# Patient Record
Sex: FEMALE
[De-identification: names, ages, dates, MRNs, and addresses within clinical notes are randomized per-mention and may not be internally consistent; named-entity substitution may affect disease eponyms.]

---

## 2019-09-17 ENCOUNTER — APPOINTMENT (OUTPATIENT)
Dept: CARDIOLOGY | Facility: CLINIC | Age: 59
End: 2019-09-17
Payer: MEDICAID

## 2019-09-17 ENCOUNTER — NON-APPOINTMENT (OUTPATIENT)
Age: 59
End: 2019-09-17

## 2019-09-17 VITALS
HEIGHT: 63 IN | SYSTOLIC BLOOD PRESSURE: 146 MMHG | OXYGEN SATURATION: 100 % | DIASTOLIC BLOOD PRESSURE: 80 MMHG | BODY MASS INDEX: 23.57 KG/M2 | HEART RATE: 54 BPM | WEIGHT: 133 LBS

## 2019-09-17 PROCEDURE — 99204 OFFICE O/P NEW MOD 45 MIN: CPT

## 2019-09-17 PROCEDURE — 93000 ELECTROCARDIOGRAM COMPLETE: CPT

## 2019-09-17 NOTE — HISTORY OF PRESENT ILLNESS
[FreeTextEntry1] : 59 year old woman with history of HTN and diet controlled HLD comes in after 3 years with abnormal stress echo. She was referred for stress echo after vague anterior chest pain that she related to GERD. She walked for 9 minutes and 30 seconds on the Justin protocol (results scanned in ) and she developed possible anterior WMA on stress imaging. She was told to get CTA and sought my opinion. EKG reviewed and no ischemic changes. Remains on Atenolol\par She is very active without pain.

## 2019-09-17 NOTE — PHYSICAL EXAM
[General Appearance - Well Developed] : well developed [Normal Appearance] : normal appearance [Well Groomed] : well groomed [General Appearance - Well Nourished] : well nourished [No Deformities] : no deformities [General Appearance - In No Acute Distress] : no acute distress [Normal Oral Mucosa] : normal oral mucosa [No Oral Pallor] : no oral pallor [No Oral Cyanosis] : no oral cyanosis [Normal Jugular Venous A Waves Present] : normal jugular venous A waves present [Normal Jugular Venous V Waves Present] : normal jugular venous V waves present [No Jugular Venous Ceron A Waves] : no jugular venous ceron A waves [Heart Rate And Rhythm] : heart rate and rhythm were normal [Murmurs] : no murmurs present [Heart Sounds] : normal S1 and S2 [Respiration, Rhythm And Depth] : normal respiratory rhythm and effort [Exaggerated Use Of Accessory Muscles For Inspiration] : no accessory muscle use [Auscultation Breath Sounds / Voice Sounds] : lungs were clear to auscultation bilaterally [Abdomen Soft] : soft [Abdomen Tenderness] : non-tender [Abdomen Mass (___ Cm)] : no abdominal mass palpated [Gait - Sufficient For Exercise Testing] : the gait was sufficient for exercise testing [Abnormal Walk] : normal gait [Nail Clubbing] : no clubbing of the fingernails [Cyanosis, Localized] : no localized cyanosis [Petechial Hemorrhages (___cm)] : no petechial hemorrhages [Skin Color & Pigmentation] : normal skin color and pigmentation [] : no rash [No Venous Stasis] : no venous stasis [Skin Lesions] : no skin lesions [No Skin Ulcers] : no skin ulcer [No Xanthoma] : no  xanthoma was observed [Oriented To Time, Place, And Person] : oriented to person, place, and time [Affect] : the affect was normal [Mood] : the mood was normal [No Anxiety] : not feeling anxious

## 2019-09-17 NOTE — DISCUSSION/SUMMARY
[FreeTextEntry1] : 59 year old woman with HTN HLD and abnormal stress test here for followup\par -check CTA Cors to assess for significant CAD\par -Reinforced diet control of lipids\par (labs reviewed and relatively unchanged from 2016 however will readdress after CT)\par -continue atenolol

## 2019-10-11 ENCOUNTER — OUTPATIENT (OUTPATIENT)
Dept: OUTPATIENT SERVICES | Facility: HOSPITAL | Age: 59
LOS: 1 days | End: 2019-10-11
Payer: MEDICAID

## 2019-10-11 ENCOUNTER — APPOINTMENT (OUTPATIENT)
Dept: CARDIOLOGY | Facility: CLINIC | Age: 59
End: 2019-10-11

## 2019-10-11 DIAGNOSIS — R07.9 CHEST PAIN, UNSPECIFIED: ICD-10-CM

## 2019-10-11 PROCEDURE — 75574 CT ANGIO HRT W/3D IMAGE: CPT | Mod: 26

## 2019-10-11 PROCEDURE — 75574 CT ANGIO HRT W/3D IMAGE: CPT

## 2019-10-15 ENCOUNTER — MESSAGE (OUTPATIENT)
Age: 59
End: 2019-10-15

## 2019-10-29 ENCOUNTER — NON-APPOINTMENT (OUTPATIENT)
Age: 59
End: 2019-10-29

## 2019-10-29 ENCOUNTER — APPOINTMENT (OUTPATIENT)
Dept: CARDIOLOGY | Facility: CLINIC | Age: 59
End: 2019-10-29
Payer: MEDICAID

## 2019-10-29 VITALS
OXYGEN SATURATION: 100 % | WEIGHT: 130 LBS | HEART RATE: 58 BPM | DIASTOLIC BLOOD PRESSURE: 81 MMHG | SYSTOLIC BLOOD PRESSURE: 155 MMHG | HEIGHT: 63 IN | BODY MASS INDEX: 23.04 KG/M2

## 2019-10-29 PROCEDURE — 99214 OFFICE O/P EST MOD 30 MIN: CPT

## 2019-10-29 PROCEDURE — 93000 ELECTROCARDIOGRAM COMPLETE: CPT

## 2019-10-29 NOTE — HISTORY OF PRESENT ILLNESS
[FreeTextEntry1] : Here for followup. Had CT cor on 10/11/19 and results below. No complaints. On ASA therapy and Atenolol daily. EKG reviewed and normal.\par \par Lipids checked 9/11/19 at outside offcie showed //HDL 49/ \par \par Cardiac CT:\par IMPRESSION:  \par 1. Coronary artery calcium score =  1 Agatston Units (between the 50th \par and 75th percentile for subjects of the same age and gender).\par 2. Coronaries: \par --LM: contains luminal irregularities but is patent. \par --LAD: contains mixed plaque in the proximal segment with mild \par (approximately 30%) luminal stenosis.  There is non-calcified plaque in \par the mid LAD with likely moderate (approximately 60-69%) luminal stenosis. \par --LCX: contains luminal irregularities but appears patent.  \par --RCA: contains non-calcified plaque in the proximal segment with mild \par (approximately 30%) luminal stenosis. \par \par \par

## 2019-10-29 NOTE — DISCUSSION/SUMMARY
[FreeTextEntry1] : 59 year old woman with history of HTN and HLD now with mild to moderate plaque on CTA Cor here for followup. Asymptomatic\par EKG unchanged\par Continue ASA\par Start Lipitor 10 mg daily\par Cont Atenolol\par FU in 3 months

## 2019-10-29 NOTE — PHYSICAL EXAM
[General Appearance - Well Developed] : well developed [Normal Appearance] : normal appearance [Well Groomed] : well groomed [General Appearance - Well Nourished] : well nourished [No Deformities] : no deformities [General Appearance - In No Acute Distress] : no acute distress [Normal Oral Mucosa] : normal oral mucosa [No Oral Pallor] : no oral pallor [No Oral Cyanosis] : no oral cyanosis [Normal Jugular Venous A Waves Present] : normal jugular venous A waves present [Normal Jugular Venous V Waves Present] : normal jugular venous V waves present [No Jugular Venous Ceron A Waves] : no jugular venous ceron A waves [Respiration, Rhythm And Depth] : normal respiratory rhythm and effort [Exaggerated Use Of Accessory Muscles For Inspiration] : no accessory muscle use [Auscultation Breath Sounds / Voice Sounds] : lungs were clear to auscultation bilaterally [Heart Rate And Rhythm] : heart rate and rhythm were normal [Heart Sounds] : normal S1 and S2 [Murmurs] : no murmurs present [Abdomen Soft] : soft [Abdomen Tenderness] : non-tender [Abdomen Mass (___ Cm)] : no abdominal mass palpated [Abnormal Walk] : normal gait [Gait - Sufficient For Exercise Testing] : the gait was sufficient for exercise testing [Nail Clubbing] : no clubbing of the fingernails [Cyanosis, Localized] : no localized cyanosis [Petechial Hemorrhages (___cm)] : no petechial hemorrhages [Skin Color & Pigmentation] : normal skin color and pigmentation [] : no rash [No Venous Stasis] : no venous stasis [Skin Lesions] : no skin lesions [No Skin Ulcers] : no skin ulcer [No Xanthoma] : no  xanthoma was observed [Oriented To Time, Place, And Person] : oriented to person, place, and time [Affect] : the affect was normal [Mood] : the mood was normal [No Anxiety] : not feeling anxious

## 2019-10-30 ENCOUNTER — APPOINTMENT (OUTPATIENT)
Dept: OTOLARYNGOLOGY | Facility: CLINIC | Age: 59
End: 2019-10-30

## 2019-12-19 ENCOUNTER — APPOINTMENT (OUTPATIENT)
Dept: OTOLARYNGOLOGY | Facility: CLINIC | Age: 59
End: 2019-12-19
Payer: COMMERCIAL

## 2019-12-19 VITALS
WEIGHT: 130 LBS | DIASTOLIC BLOOD PRESSURE: 98 MMHG | SYSTOLIC BLOOD PRESSURE: 160 MMHG | HEART RATE: 99 BPM | BODY MASS INDEX: 23.04 KG/M2 | HEIGHT: 63 IN

## 2019-12-19 DIAGNOSIS — H93.11 TINNITUS, RIGHT EAR: ICD-10-CM

## 2019-12-19 DIAGNOSIS — R42 DIZZINESS AND GIDDINESS: ICD-10-CM

## 2019-12-19 PROCEDURE — 99204 OFFICE O/P NEW MOD 45 MIN: CPT
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
## 2019-12-19 NOTE — PHYSICAL EXAM
[Nystagmus] : ~T no ~M nystagmus was seen [Fukuda Step Test] : Fukuda Step Test was Negative [Fistula Sign] : Fistula Sign: Negative [Lucia-Hallslimke] : Salmon-Hallpike: Negative [de-identified] : wnl [Midline] : trachea located in midline position [Normal] : no rashes

## 2019-12-19 NOTE — ASSESSMENT
[FreeTextEntry1] : r/o Right delayed endolymphatic Hydrops w/ assoc tinnitus\par  2/2 trauma\par No TMJ noted\par Rec acupuncture and Arches Vitamin tx\par void loud noises\par will obtain audio for my review\par f/u 2-3 months

## 2019-12-19 NOTE — REASON FOR VISIT
[Initial Evaluation] : an initial evaluation for [Vertigo] : vertigo [Tinnitus] : tinnitus [Spouse] : spouse

## 2020-01-31 ENCOUNTER — APPOINTMENT (OUTPATIENT)
Dept: CARDIOLOGY | Facility: CLINIC | Age: 60
End: 2020-01-31

## 2020-03-05 ENCOUNTER — APPOINTMENT (OUTPATIENT)
Dept: OTOLARYNGOLOGY | Facility: CLINIC | Age: 60
End: 2020-03-05

## 2021-06-14 ENCOUNTER — APPOINTMENT (OUTPATIENT)
Dept: GASTROENTEROLOGY | Facility: CLINIC | Age: 61
End: 2021-06-14
Payer: MEDICAID

## 2021-06-14 VITALS
HEIGHT: 63 IN | BODY MASS INDEX: 23.04 KG/M2 | SYSTOLIC BLOOD PRESSURE: 140 MMHG | OXYGEN SATURATION: 100 % | DIASTOLIC BLOOD PRESSURE: 84 MMHG | WEIGHT: 130 LBS | HEART RATE: 63 BPM

## 2021-06-14 DIAGNOSIS — Z12.12 ENCOUNTER FOR SCREENING FOR MALIGNANT NEOPLASM OF COLON: ICD-10-CM

## 2021-06-14 DIAGNOSIS — Z12.11 ENCOUNTER FOR SCREENING FOR MALIGNANT NEOPLASM OF COLON: ICD-10-CM

## 2021-06-14 PROCEDURE — 99204 OFFICE O/P NEW MOD 45 MIN: CPT

## 2021-06-14 NOTE — ASSESSMENT
[FreeTextEntry1] : IMPRESSION: \par #  Requesting a colonoscopy \par -   Colonoscopy 5 years ago normal from what she recalls - she does not remember when she was told to have a repeat exam - she will work with our staff to get records from her prior gastroenterologist.  \par \par -  Occasional diarrhea when over eating for many years \par -  Cholecystectomy \par \par #  Comorbidities:  HTN, hyperlipemia, CAD \par #  Surgery:  Cholecystectomy\par \par \par \par \par PLAN: \par I had a long discussion with the patient regarding colon cancer in the [place].    \par \par We reviewed risk factors for colon cancer which include age, having a family Hx of colon cancer, cancer syndromes, personal Hx of inflammatory disease, having comorbidities such as obesity, DM, cardiac disease, having nicotine addiction, alcohol addiction,etc.  \par \par Patient is average risk for colon cancer - age.   \par \par We reviewed the screening tests for colon cancer prevention.  We discussed screening tests such as stool test, CT scans such as CT colonography, and a colonoscopy.    Patient was made aware that despite these screening test, a cancerous lesion can still be missed.  The gold standard test is a screening colonoscopy.\par \par I have advised the patient to arrange for a colonoscopy to rule out colon polyps, colorectal cancer etc. under monitored anesthesia care.  Risks such as perforation  (4 in 10,000) requiring surgery, bleeding (8 in 10,000), infection, diverticulitis, colitis, missed colon cancer (2% to 6%), internal organ injury, etc, risks of bowel prep including colitis, syncope, adverse reaction to medication etc. and risks of anesthesia including cardiopulmonary compromise were discussed with patient.  Patient verbalized understanding and would like to get records of prior colonoscopy to decide when she would like another screening test.  \par \par She was made aware that if she can't get the records with the help of our staff then best to arrange a colonoscopy since she is average risk for colon cancer.  \par \par She will get clearance from her cardiologist when the time comes for a colonoscopy.

## 2021-06-14 NOTE — HISTORY OF PRESENT ILLNESS
[de-identified] : 62 y/o mother of three, with Hx of HTN, hyperlipidemia, CAD, s/p cholecystectomy who presents for a colonoscopy. \par \par 5 years ago had a colonoscopy - no polyps were found - does not recall when she was told to have a repeat exam - her doctor was Dr. Israel.  \par \par No constipation. \par If over eating she says she will have diarrhea since many years - since her gallbladder surgery 34 years ago. Since 10 years she has been strictly vegetarian. \par \par No melena and no hematochezia. \par \par No abdominal pain. \par \par No loss of appetite, no abnormal weight loss. \par \par No heartburn, no difficulty swallowing, no painful swallowing. \par No nausea, no vomiting. \par \par Patient denies family history of GI cancers.\par \par Two of her children are doctors, and one is . \par \par All other review of systems are negative.  Denies cardiac symptoms.

## 2021-06-14 NOTE — PHYSICAL EXAM
[General Appearance - Alert] : alert [General Appearance - In No Acute Distress] : in no acute distress [Sclera] : the sclera and conjunctiva were normal [Extraocular Movements] : extraocular movements were intact [Outer Ear] : the ears and nose were normal in appearance [Hearing Threshold Finger Rub Not [place]] : hearing was normal [Neck Appearance] : the appearance of the neck was normal [Neck Cervical Mass (___cm)] : no neck mass was observed [Auscultation Breath Sounds / Voice Sounds] : lungs were clear to auscultation bilaterally [Heart Rate And Rhythm] : heart rate was normal and rhythm regular [Heart Sounds] : normal S1 and S2 [Heart Sounds Gallop] : no gallops [Murmurs] : no murmurs [Heart Sounds Pericardial Friction Rub] : no pericardial rub [Bowel Sounds] : normal bowel sounds [Abdomen Soft] : soft [Abdomen Tenderness] : non-tender [] : no hepato-splenomegaly [Abdomen Mass (___ Cm)] : no abdominal mass palpated [Cervical Lymph Nodes Enlarged Posterior Bilaterally] : posterior cervical [Supraclavicular Lymph Nodes Enlarged Bilaterally] : supraclavicular [Abnormal Walk] : normal gait [Nail Clubbing] : no clubbing  or cyanosis of the fingernails [Oriented To Time, Place, And Person] : oriented to person, place, and time [Impaired Insight] : insight and judgment were intact [Affect] : the affect was normal [FreeTextEntry1] : RUBENOIT scar

## 2021-07-15 ENCOUNTER — APPOINTMENT (OUTPATIENT)
Dept: CARDIOLOGY | Facility: CLINIC | Age: 61
End: 2021-07-15
Payer: MEDICAID

## 2021-07-15 ENCOUNTER — NON-APPOINTMENT (OUTPATIENT)
Age: 61
End: 2021-07-15

## 2021-07-15 VITALS
OXYGEN SATURATION: 100 % | BODY MASS INDEX: 23.04 KG/M2 | SYSTOLIC BLOOD PRESSURE: 153 MMHG | TEMPERATURE: 96.6 F | DIASTOLIC BLOOD PRESSURE: 78 MMHG | HEIGHT: 63 IN | HEART RATE: 57 BPM | RESPIRATION RATE: 16 BRPM | WEIGHT: 130 LBS

## 2021-07-15 PROCEDURE — 99213 OFFICE O/P EST LOW 20 MIN: CPT

## 2021-07-15 PROCEDURE — 93000 ELECTROCARDIOGRAM COMPLETE: CPT

## 2021-07-15 NOTE — DISCUSSION/SUMMARY
[FreeTextEntry1] : 59 year old woman with history of HTN and HLD now with mild to moderate plaque on CTA Cor here for followup. Asymptomatic\par EKG unchanged\par Continue ASA\par Cont Atenolol\par Will reach to Dr. Arreola regarding lipids since she had myopathy and severe increase in CK with statin use\par FU in 3 months

## 2021-07-15 NOTE — HISTORY OF PRESENT ILLNESS
[FreeTextEntry1] : Here for followup. Had CT cor on 10/11/19 and results below. No complaints. On ASA therapy and Atenolol daily. EKG reviewed and normal.\par \par Lipids checked 5/11/2021:\par  /HDL 54/\par \par Cardiac CT: 2019\par IMPRESSION:  \par 1. Coronary artery calcium score =  1 Agatston Units (between the 50th \par and 75th percentile for subjects of the same age and gender).\par 2. Coronaries: \par --LM: contains luminal irregularities but is patent. \par --LAD: contains mixed plaque in the proximal segment with mild \par (approximately 30%) luminal stenosis.  There is non-calcified plaque in \par the mid LAD with likely moderate (approximately 60-69%) luminal stenosis. \par --LCX: contains luminal irregularities but appears patent.  \par --RCA: contains non-calcified plaque in the proximal segment with mild \par (approximately 30%) luminal stenosis. \par \par \par

## 2021-08-18 PROBLEM — R74.8 ELEVATED CK: Status: ACTIVE | Noted: 2021-08-18

## 2021-08-23 ENCOUNTER — APPOINTMENT (OUTPATIENT)
Dept: CARDIOLOGY | Facility: CLINIC | Age: 61
End: 2021-08-23
Payer: MEDICAID

## 2021-08-23 VITALS
WEIGHT: 130 LBS | BODY MASS INDEX: 23.04 KG/M2 | HEART RATE: 61 BPM | HEIGHT: 63 IN | RESPIRATION RATE: 16 BRPM | DIASTOLIC BLOOD PRESSURE: 84 MMHG | SYSTOLIC BLOOD PRESSURE: 160 MMHG | OXYGEN SATURATION: 98 %

## 2021-08-23 DIAGNOSIS — R74.8 ABNORMAL LEVELS OF OTHER SERUM ENZYMES: ICD-10-CM

## 2021-08-23 PROCEDURE — 99215 OFFICE O/P EST HI 40 MIN: CPT | Mod: 25

## 2021-08-23 PROCEDURE — 99402 PREV MED CNSL INDIV APPRX 30: CPT

## 2021-09-09 LAB
CHOLEST SERPL-MCNC: 127 MG/DL
HDLC SERPL-MCNC: 47 MG/DL
LDLC SERPL CALC-MCNC: 44 MG/DL
NONHDLC SERPL-MCNC: 80 MG/DL
TRIGL SERPL-MCNC: 180 MG/DL

## 2021-09-15 ENCOUNTER — NON-APPOINTMENT (OUTPATIENT)
Age: 61
End: 2021-09-15

## 2021-10-05 ENCOUNTER — EMERGENCY (EMERGENCY)
Facility: HOSPITAL | Age: 61
LOS: 1 days | Discharge: HOME | End: 2021-10-05
Attending: EMERGENCY MEDICINE | Admitting: EMERGENCY MEDICINE
Payer: MEDICAID

## 2021-10-05 VITALS
HEART RATE: 61 BPM | WEIGHT: 130.07 LBS | TEMPERATURE: 97 F | OXYGEN SATURATION: 100 % | SYSTOLIC BLOOD PRESSURE: 166 MMHG | RESPIRATION RATE: 18 BRPM | HEIGHT: 63 IN | DIASTOLIC BLOOD PRESSURE: 94 MMHG

## 2021-10-05 VITALS
DIASTOLIC BLOOD PRESSURE: 87 MMHG | OXYGEN SATURATION: 100 % | HEART RATE: 65 BPM | RESPIRATION RATE: 18 BRPM | TEMPERATURE: 98 F | SYSTOLIC BLOOD PRESSURE: 132 MMHG

## 2021-10-05 LAB
ALBUMIN SERPL ELPH-MCNC: 3.5 G/DL — SIGNIFICANT CHANGE UP (ref 3.3–5)
ALP SERPL-CCNC: 96 U/L — SIGNIFICANT CHANGE UP (ref 40–120)
ALT FLD-CCNC: 24 U/L — SIGNIFICANT CHANGE UP (ref 12–78)
ANION GAP SERPL CALC-SCNC: 6 MMOL/L — SIGNIFICANT CHANGE UP (ref 5–17)
AST SERPL-CCNC: 19 U/L — SIGNIFICANT CHANGE UP (ref 15–37)
BASOPHILS # BLD AUTO: 0.06 K/UL — SIGNIFICANT CHANGE UP (ref 0–0.2)
BASOPHILS NFR BLD AUTO: 0.8 % — SIGNIFICANT CHANGE UP (ref 0–2)
BILIRUB SERPL-MCNC: 0.3 MG/DL — SIGNIFICANT CHANGE UP (ref 0.2–1.2)
BUN SERPL-MCNC: 8 MG/DL — SIGNIFICANT CHANGE UP (ref 7–23)
CALCIUM SERPL-MCNC: 8.6 MG/DL — SIGNIFICANT CHANGE UP (ref 8.5–10.1)
CHLORIDE SERPL-SCNC: 105 MMOL/L — SIGNIFICANT CHANGE UP (ref 96–108)
CO2 SERPL-SCNC: 26 MMOL/L — SIGNIFICANT CHANGE UP (ref 22–31)
CREAT SERPL-MCNC: 0.63 MG/DL — SIGNIFICANT CHANGE UP (ref 0.5–1.3)
EOSINOPHIL # BLD AUTO: 0.04 K/UL — SIGNIFICANT CHANGE UP (ref 0–0.5)
EOSINOPHIL NFR BLD AUTO: 0.6 % — SIGNIFICANT CHANGE UP (ref 0–6)
GLUCOSE SERPL-MCNC: 115 MG/DL — HIGH (ref 70–99)
HCT VFR BLD CALC: 34.8 % — SIGNIFICANT CHANGE UP (ref 34.5–45)
HGB BLD-MCNC: 11.6 G/DL — SIGNIFICANT CHANGE UP (ref 11.5–15.5)
IMM GRANULOCYTES NFR BLD AUTO: 0.3 % — SIGNIFICANT CHANGE UP (ref 0–1.5)
LIDOCAIN IGE QN: 124 U/L — SIGNIFICANT CHANGE UP (ref 73–393)
LYMPHOCYTES # BLD AUTO: 2.04 K/UL — SIGNIFICANT CHANGE UP (ref 1–3.3)
LYMPHOCYTES # BLD AUTO: 28.8 % — SIGNIFICANT CHANGE UP (ref 13–44)
MCHC RBC-ENTMCNC: 28.4 PG — SIGNIFICANT CHANGE UP (ref 27–34)
MCHC RBC-ENTMCNC: 33.3 GM/DL — SIGNIFICANT CHANGE UP (ref 32–36)
MCV RBC AUTO: 85.3 FL — SIGNIFICANT CHANGE UP (ref 80–100)
MONOCYTES # BLD AUTO: 0.49 K/UL — SIGNIFICANT CHANGE UP (ref 0–0.9)
MONOCYTES NFR BLD AUTO: 6.9 % — SIGNIFICANT CHANGE UP (ref 2–14)
NEUTROPHILS # BLD AUTO: 4.43 K/UL — SIGNIFICANT CHANGE UP (ref 1.8–7.4)
NEUTROPHILS NFR BLD AUTO: 62.6 % — SIGNIFICANT CHANGE UP (ref 43–77)
NRBC # BLD: 0 /100 WBCS — SIGNIFICANT CHANGE UP (ref 0–0)
PLATELET # BLD AUTO: 174 K/UL — SIGNIFICANT CHANGE UP (ref 150–400)
POTASSIUM SERPL-MCNC: 4.3 MMOL/L — SIGNIFICANT CHANGE UP (ref 3.5–5.3)
POTASSIUM SERPL-SCNC: 4.3 MMOL/L — SIGNIFICANT CHANGE UP (ref 3.5–5.3)
PROT SERPL-MCNC: 7.8 G/DL — SIGNIFICANT CHANGE UP (ref 6–8.3)
RBC # BLD: 4.08 M/UL — SIGNIFICANT CHANGE UP (ref 3.8–5.2)
RBC # FLD: 12.9 % — SIGNIFICANT CHANGE UP (ref 10.3–14.5)
SODIUM SERPL-SCNC: 137 MMOL/L — SIGNIFICANT CHANGE UP (ref 135–145)
TROPONIN I SERPL-MCNC: <.015 NG/ML — SIGNIFICANT CHANGE UP (ref 0.01–0.04)
WBC # BLD: 7.08 K/UL — SIGNIFICANT CHANGE UP (ref 3.8–10.5)
WBC # FLD AUTO: 7.08 K/UL — SIGNIFICANT CHANGE UP (ref 3.8–10.5)

## 2021-10-05 PROCEDURE — 71045 X-RAY EXAM CHEST 1 VIEW: CPT

## 2021-10-05 PROCEDURE — 99284 EMERGENCY DEPT VISIT MOD MDM: CPT

## 2021-10-05 PROCEDURE — 83690 ASSAY OF LIPASE: CPT

## 2021-10-05 PROCEDURE — 80053 COMPREHEN METABOLIC PANEL: CPT

## 2021-10-05 PROCEDURE — 93010 ELECTROCARDIOGRAM REPORT: CPT

## 2021-10-05 PROCEDURE — 85025 COMPLETE CBC W/AUTO DIFF WBC: CPT

## 2021-10-05 PROCEDURE — 84484 ASSAY OF TROPONIN QUANT: CPT

## 2021-10-05 PROCEDURE — 36415 COLL VENOUS BLD VENIPUNCTURE: CPT

## 2021-10-05 PROCEDURE — 99284 EMERGENCY DEPT VISIT MOD MDM: CPT | Mod: 25

## 2021-10-05 PROCEDURE — 93005 ELECTROCARDIOGRAM TRACING: CPT

## 2021-10-05 PROCEDURE — 99285 EMERGENCY DEPT VISIT HI MDM: CPT

## 2021-10-05 PROCEDURE — 71045 X-RAY EXAM CHEST 1 VIEW: CPT | Mod: 26

## 2021-10-05 RX ORDER — SODIUM CHLORIDE 9 MG/ML
1000 INJECTION INTRAMUSCULAR; INTRAVENOUS; SUBCUTANEOUS ONCE
Refills: 0 | Status: COMPLETED | OUTPATIENT
Start: 2021-10-05 | End: 2021-10-05

## 2021-10-05 RX ORDER — PANTOPRAZOLE SODIUM 20 MG/1
40 TABLET, DELAYED RELEASE ORAL ONCE
Refills: 0 | Status: COMPLETED | OUTPATIENT
Start: 2021-10-05 | End: 2021-10-05

## 2021-10-05 RX ADMIN — SODIUM CHLORIDE 1000 MILLILITER(S): 9 INJECTION INTRAMUSCULAR; INTRAVENOUS; SUBCUTANEOUS at 14:46

## 2021-10-05 RX ADMIN — Medication 30 MILLILITER(S): at 14:46

## 2021-10-05 RX ADMIN — PANTOPRAZOLE SODIUM 40 MILLIGRAM(S): 20 TABLET, DELAYED RELEASE ORAL at 14:46
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
## 2021-10-05 NOTE — ED PROVIDER NOTE - CLINICAL SUMMARY MEDICAL DECISION MAKING FREE TEXT BOX
62 yo female with h/o CAD, HTN, HLD presents to the ED c/o chest pain x several days. Patient explains she feels burning in chest and upper abdomen. Associated with mild nausea. Patient feels chocking sensation in chest. Patient tried taking pepcid and omeprazole with minimal improvement. Denies fever, chills, sob, abd pain, vomiting, UE/LE weakness or paresthesias. Denies recent travel/trauma/surgery, calf pain/swelling, h/o DVT/PE, or hormone use. Patient had CT coronaries in 2019 which showed mixed plaque 60-69% in LAD.  PE: as above. A/P: ekg, labs, cxr, meds, fluids, cards eval in ED.

## 2021-10-05 NOTE — CONSULT NOTE ADULT - SUBJECTIVE AND OBJECTIVE BOX
**Charting in progress**    [facility]         Damian Loredo, Sheldon, Jovani, Serjio, Samson Schwab        [phone] (office)    Reason for Consult: Chest pain    HPI: 61y patient with PMH of HTN, HLD, and GERD presenting today for chest pain that started last night. Pt states that chest pain in burning in nature in the substernal region. It then began to spread to the left and right side of the chest and patient felt like she was choking. She admits to having nausea last night but no vomiting.  She states that she has been taking omeprazole and famotidine for her GERD symptoms but this felt worse. She has been trying to see a GI doctor for an endoscopy for GERD symptoms but does not have an appointment scheduled yet. Pt states that the pain has not subsided and she feels much better. She has a slight choking sensation but it has improved since yesterday. Pt currently denies CP, palpitations, SOB, nausea, vomiting, abdominal pain or LE edema.      Cardiologist is Dr. Tessy De Anda.    Cardiac CT: 2019  IMPRESSION:   1. Coronary artery calcium score = 1 Agatston Units (between the 50th   and 75th percentile for subjects of the same age and gender).  2. Coronaries:   --LM: contains luminal irregularities but is patent.   --LAD: contains mixed plaque in the proximal segment with mild   (approximately 30%) luminal stenosis. There is non-calcified plaque in   the mid LAD with likely moderate (approximately 60-69%) luminal stenosis.   --LCX: contains luminal irregularities but appears patent.   --RCA: contains non-calcified plaque in the proximal segment with mild   (approximately 30%) luminal stenosis.     PAST MEDICAL & SURGICAL HISTORY:  Hypertension  HLD  GERD    SOCIAL HISTORY: No active tobacco, alcohol or illicit drug use    FAMILY HISTORY: No family history of early MI or cardiac disease.       Home Medications:  atentolol 10mg daily   aspirin 81mg daily  PCSK9i injections      MEDICATIONS  (STANDING):    MEDICATIONS  (PRN):      Allergies    statins- Rhabdomyolysis     Intolerances        REVIEW OF SYSTEMS: Negative except as per HPI.    VITAL SIGNS:   Vital Signs Last 24 Hrs  T(C): 36.3 (05 Oct 2021 13:45), Max: 36.3 (05 Oct 2021 13:45)  T(F): 97.3 (05 Oct 2021 13:45), Max: 97.3 (05 Oct 2021 13:45)  HR: 61 (05 Oct 2021 13:45) (61 - 61)  BP: 166/94 (05 Oct 2021 13:45) (166/94 - 166/94)  BP(mean): --  RR: 18 (05 Oct 2021 13:45) (18 - 18)  SpO2: 100% (05 Oct 2021 13:45) (100% - 100%)    I&O's Summary      PHYSICAL EXAM:  Constitutional: NAD, well-developed  HEENT NC/AT, moist mucous membranes  Pulmonary: Non-labored, breath sounds are clear bilaterally, no wheezing, rales or rhonchi  Cardiovascular: +S1, S2, RRR, no murmur  Gastrointestinal: Soft, nontender, nondistended, normoactive bowel sounds  Extremities: No peripheral edema   Neurological: Alert, strength and sensitivity are grossly intact  Skin: No obvious lesions/rashes  Psych: Mood & affect appropriate    LABS: All Labs Reviewed:                        11.6   7.08  )-----------( 174      ( 05 Oct 2021 14:43 )             34.8     05 Oct 2021 14:43    137    |  105    |  8      ----------------------------<  115    4.3     |  26     |  0.63     Ca    8.6        05 Oct 2021 14:43    TPro  7.8    /  Alb  3.5    /  TBili  0.3    /  DBili  x      /  AST  19     /  ALT  24     /  AlkPhos  96     05 Oct 2021 14:43      CARDIAC MARKERS ( 05 Oct 2021 14:43 )  <.015 ng/mL / x     / x     / x     / x          Blood Culture:         EKG: NSR at 69bpm     **Charting in progress**    [facility]         Damian Loredo, Sheldon, Jovani, Serjio, Samson Schwab        [phone] (office)    Reason for Consult: Chest pain    HPI: 61y patient with PMH of HTN, HLD, and GERD presenting today for chest pain that started last night. Pt states that chest pain in burning in nature in the substernal region. It then began to spread to the left and right side of the chest and patient felt like she was choking. She admits to having nausea last night but no vomiting.  She states that she has been taking omeprazole and famotidine for her GERD symptoms but this felt worse. She has been trying to see a GI doctor for an endoscopy for GERD symptoms but does not have an appointment scheduled yet. Pt states that the pain has not subsided and she feels much better. She has a slight choking sensation but it has improved since yesterday. Pt currently denies CP, palpitations, SOB, nausea, vomiting, abdominal pain or LE edema.      Cardiologist is Dr. Tessy De Anda.    Cardiac CT: 2019  IMPRESSION:   1. Coronary artery calcium score = 1 Agatston Units (between the 50th   and 75th percentile for subjects of the same age and gender).  2. Coronaries:   --LM: contains luminal irregularities but is patent.   --LAD: contains mixed plaque in the proximal segment with mild   (approximately 30%) luminal stenosis. There is non-calcified plaque in   the mid LAD with likely moderate (approximately 60-69%) luminal stenosis.   --LCX: contains luminal irregularities but appears patent.   --RCA: contains non-calcified plaque in the proximal segment with mild   (approximately 30%) luminal stenosis.     PAST MEDICAL & SURGICAL HISTORY:  Hypertension  HLD  GERD    SOCIAL HISTORY: No active tobacco, alcohol or illicit drug use    FAMILY HISTORY: No family history of early MI or cardiac disease.       Home Medications:  atentolol 50mg daily   aspirin 81mg daily  PCSK9i injections      MEDICATIONS  (STANDING):    MEDICATIONS  (PRN):      Allergies    statins- Rhabdomyolysis     Intolerances        REVIEW OF SYSTEMS: Negative except as per HPI.    VITAL SIGNS:   Vital Signs Last 24 Hrs  T(C): 36.3 (05 Oct 2021 13:45), Max: 36.3 (05 Oct 2021 13:45)  T(F): 97.3 (05 Oct 2021 13:45), Max: 97.3 (05 Oct 2021 13:45)  HR: 61 (05 Oct 2021 13:45) (61 - 61)  BP: 166/94 (05 Oct 2021 13:45) (166/94 - 166/94)  BP(mean): --  RR: 18 (05 Oct 2021 13:45) (18 - 18)  SpO2: 100% (05 Oct 2021 13:45) (100% - 100%)    I&O's Summary      PHYSICAL EXAM:  Constitutional: NAD, well-developed  HEENT NC/AT, moist mucous membranes  Pulmonary: Non-labored, breath sounds are clear bilaterally, no wheezing, rales or rhonchi  Cardiovascular: +S1, S2, RRR, no murmur  Gastrointestinal: Soft, nontender, nondistended, normoactive bowel sounds  Extremities: No peripheral edema   Neurological: Alert, strength and sensitivity are grossly intact  Skin: No obvious lesions/rashes  Psych: Mood & affect appropriate    LABS: All Labs Reviewed:                        11.6   7.08  )-----------( 174      ( 05 Oct 2021 14:43 )             34.8     05 Oct 2021 14:43    137    |  105    |  8      ----------------------------<  115    4.3     |  26     |  0.63     Ca    8.6        05 Oct 2021 14:43    TPro  7.8    /  Alb  3.5    /  TBili  0.3    /  DBili  x      /  AST  19     /  ALT  24     /  AlkPhos  96     05 Oct 2021 14:43      CARDIAC MARKERS ( 05 Oct 2021 14:43 )  <.015 ng/mL / x     / x     / x     / x          Blood Culture:         EKG: NSR at 69bpm     [facility]         Damian Loredo, Sheldon, Jovani, Serjio, Zaheer, Samson        [phone] (office)    Reason for Consult: Chest pain    HPI: 61y patient with PMH of HTN, HLD, and GERD presenting today for chest pain that started last night. Pt states that chest pain in burning in nature in the substernal region. It then began to spread to the left and right side of the chest and patient felt like she was choking. She admits to having nausea last night but no vomiting.  She states that she has been taking omeprazole and famotidine for her GERD symptoms but this felt worse. She has been trying to see a GI doctor for an endoscopy for GERD symptoms but does not have an appointment scheduled yet. Pt states that the pain has not subsided and she feels much better. She has a slight choking sensation but it has improved since yesterday. Pt currently denies CP, palpitations, SOB, nausea, vomiting, abdominal pain or LE edema.      Cardiologist is Dr. Tessy De Anda.    Cardiac CT: 2019  IMPRESSION:   1. Coronary artery calcium score = 1 Agatston Units (between the 50th   and 75th percentile for subjects of the same age and gender).  2. Coronaries:   --LM: contains luminal irregularities but is patent.   --LAD: contains mixed plaque in the proximal segment with mild   (approximately 30%) luminal stenosis. There is non-calcified plaque in   the mid LAD with likely moderate (approximately 60-69%) luminal stenosis.   --LCX: contains luminal irregularities but appears patent.   --RCA: contains non-calcified plaque in the proximal segment with mild   (approximately 30%) luminal stenosis.     PAST MEDICAL & SURGICAL HISTORY:  Hypertension  HLD  GERD    SOCIAL HISTORY: No active tobacco, alcohol or illicit drug use    FAMILY HISTORY: No family history of early MI or cardiac disease.       Home Medications:  atentolol 50mg daily   aspirin 81mg daily  PCSK9i injections      MEDICATIONS  (STANDING):    MEDICATIONS  (PRN):      Allergies    statins- Rhabdomyolysis     Intolerances        REVIEW OF SYSTEMS: Negative except as per HPI.    VITAL SIGNS:   Vital Signs Last 24 Hrs  T(C): 36.3 (05 Oct 2021 13:45), Max: 36.3 (05 Oct 2021 13:45)  T(F): 97.3 (05 Oct 2021 13:45), Max: 97.3 (05 Oct 2021 13:45)  HR: 61 (05 Oct 2021 13:45) (61 - 61)  BP: 166/94 (05 Oct 2021 13:45) (166/94 - 166/94)  BP(mean): --  RR: 18 (05 Oct 2021 13:45) (18 - 18)  SpO2: 100% (05 Oct 2021 13:45) (100% - 100%)    I&O's Summary      PHYSICAL EXAM:  Constitutional: NAD, well-developed  HEENT NC/AT, moist mucous membranes  Pulmonary: Non-labored, breath sounds are clear bilaterally, no wheezing, rales or rhonchi  Cardiovascular: +S1, S2, RRR, no murmur  Gastrointestinal: Soft, nontender, nondistended, normoactive bowel sounds  Extremities: No peripheral edema   Neurological: Alert, strength and sensitivity are grossly intact  Skin: No obvious lesions/rashes  Psych: Mood & affect appropriate    LABS: All Labs Reviewed:                        11.6   7.08  )-----------( 174      ( 05 Oct 2021 14:43 )             34.8     05 Oct 2021 14:43    137    |  105    |  8      ----------------------------<  115    4.3     |  26     |  0.63     Ca    8.6        05 Oct 2021 14:43    TPro  7.8    /  Alb  3.5    /  TBili  0.3    /  DBili  x      /  AST  19     /  ALT  24     /  AlkPhos  96     05 Oct 2021 14:43      CARDIAC MARKERS ( 05 Oct 2021 14:43 )  <.015 ng/mL / x     / x     / x     / x          Blood Culture:       EKG: NSR at 69bpm    Stress ECHO: 70-75% in 8/2012

## 2021-10-05 NOTE — CONSULT NOTE ADULT - ATTENDING COMMENTS
Doubt cardiac etiology of chest pain.  More likely GI in etiology.  To follow closely while admitted.

## 2021-10-05 NOTE — CONSULT NOTE ADULT - ASSESSMENT
***Charting in progress***   61y patient with PMH of HTN, HLD, and GERD presenting today for chest pain that started last night consulted for chest pain.    Chest pain  - Likely related to GERD, unlikely cardiac in nature   - EKG NSR  - Pt with improvement s/p maalox and protonix  - Patient is not complaining of any cardiac symptoms at this time.  - No clear evidence of acute ischemia, trops negative x 1. Will follow up second set.  - Patient to follow up with GI outpatient                    61y patient with PMH of HTN, HLD, and GERD presenting today for chest pain that started last night consulted for chest pain.    Chest pain  - Likely related to GERD, unlikely cardiac in nature   - EKG NSR  - Pt with improvement s/p maalox and protonix  - Patient is not complaining of any cardiac symptoms at this time.  - No clear evidence of acute ischemia, trops negative x 1  - Continue to follow up with Dr. De Anda outpatient   - Patient to follow up with GI outpatient

## 2021-10-05 NOTE — ED PROVIDER NOTE - OBJECTIVE STATEMENT
60 yo female with h/o CAD, HTN, HLD presents to the ED c/o chest pain x several days. Patient explains she feels burning in chest and upper abdomen. Associated with mild nausea. Patient feels chocking sensation in chest. Patient tried taking pepcid and omeprazole with minimal improvement. Denies fever, chills, sob, abd pain, vomiting, UE/LE weakness or paresthesias. Denies recent travel/trauma/surgery, calf pain/swelling, h/o DVT/PE, or hormone use. Patient had CT coronaries in 2019 which showed mixed plaque 60-69% in LAD.     pmd: Dr. Luciano, cards: Dr. Kirstie De Anda

## 2021-10-05 NOTE — ED ADULT NURSE NOTE - OBJECTIVE STATEMENT
A/ox4 patient came to ED c.o chest pain since last night. Patient states she developed sudden onset of mid sternal chest pain. Denies SOB, n/v/d. Pending further labs and radiology reports.

## 2021-10-05 NOTE — ED PROVIDER NOTE - PATIENT PORTAL LINK FT
You can access the FollowMyHealth Patient Portal offered by [facility] by registering at the following website: [email or web address]. By joining [facility]’s FollowMyHealth portal, you will also be able to view your health information using other applications (apps) compatible with our system.

## 2021-10-05 NOTE — ED ADULT TRIAGE NOTE - CHIEF COMPLAINT QUOTE
Pt was having chest pain and discomfort for a few days was taking GI medications pt has a history of blockage
yes

## 2021-10-05 NOTE — ED PROVIDER NOTE - CARE PROVIDER_API CALL
Tessy De Anda)  Cardiovascular Disease  [facility], [address], [address]  [place] [address]  Phone: [phone]  Fax: [phone]  Follow Up Time: 1-3 Days

## 2021-10-05 NOTE — ED PROVIDER NOTE - PROGRESS NOTE DETAILS
Reevaluated patient at bedside.  Patient feeling much improved.  Discussed the results of all diagnostic testing in ED and copies of all available reports given.   An opportunity to ask questions was provided.  Discussed the importance of prompt, close medical follow-up.  Patient will return with any changes, concerns or persistent/worsening symptoms.  Understanding of all instructions verbalized. Patient cleared for discharged by cardiologist, recommend outpatient cards follow up.

## 2021-10-05 NOTE — ED PROVIDER NOTE - ATTENDING CONTRIBUTION TO CARE
60 yo f who has hld on injectible meds, atherosclerotic heart disease with an abnormal coronary calcium ct done in 2019 presents with epigastric chest pain that went up and down her esophagus. the pain then began to go across her chest and pt felt unwell  fearing an acute cardiac event because sx have persisted she came to er for eval  on eval  wd wn female nad no pallor no distress  heent nc at [facility] perrla no g f r  cor rrr chest cta  abd soft mild epigastric discomfort no ruq pain no guard no rebound no cvat  ext normal  skin normal no pallor no rash  neuro normal  plan ekg lytes ck ce cards consultation

## 2021-10-08 ENCOUNTER — APPOINTMENT (OUTPATIENT)
Dept: GASTROENTEROLOGY | Facility: CLINIC | Age: 61
End: 2021-10-08
Payer: MEDICAID

## 2021-10-08 VITALS
OXYGEN SATURATION: 99 % | WEIGHT: 130 LBS | HEART RATE: 58 BPM | SYSTOLIC BLOOD PRESSURE: 136 MMHG | HEIGHT: 63 IN | BODY MASS INDEX: 23.04 KG/M2 | DIASTOLIC BLOOD PRESSURE: 81 MMHG

## 2021-10-08 PROBLEM — E78.5 HYPERLIPIDEMIA, UNSPECIFIED: Chronic | Status: ACTIVE | Noted: 2021-10-05

## 2021-10-08 PROCEDURE — 99214 OFFICE O/P EST MOD 30 MIN: CPT

## 2021-10-08 RX ORDER — SODIUM SULFATE, POTASSIUM SULFATE, MAGNESIUM SULFATE 17.5; 3.13; 1.6 G/ML; G/ML; G/ML
17.5-3.13-1.6 SOLUTION, CONCENTRATE ORAL
Qty: 1 | Refills: 0 | Status: ACTIVE | COMMUNITY
Start: 2021-10-08 | End: 1900-01-01

## 2021-10-08 RX ORDER — OMEPRAZOLE 40 MG/1
40 CAPSULE, DELAYED RELEASE ORAL
Qty: 30 | Refills: 3 | Status: ACTIVE | COMMUNITY
Start: 2021-10-08 | End: 1900-01-01

## 2021-10-08 NOTE — HISTORY OF PRESENT ILLNESS
[de-identified] : 60 y/o mother of three, with Hx of HTN, hyperlipidemia, CAD, s/p cholecystectomy who presents  after recent ER visit for chest pain. \par \par A month started have acid reflux - burning discomfort in epigastric and chest - started taking famotidine and started feeling better.  Since last Friday - burning sever - Sunday night feeling like sometime stuck - drank a lot of water -couldn’t sleeping - called her cardiologist because she was Worried about her heart - cardiologist ER. Was given Protonix and felt better. Advised ER. \par \par Has been taking omeprazole once a day.  Still with burning discomfort - better.  \par \par Never had an upper endoscopy. \par \par No change in weight.  \par \par Patient denies having painful swallowing, nausea, vomiting, loss of appetite, weight loss, melena and hematochezia.\par \par Takes Baby Aspirin. \par \par \par \par \par \par Initial office visit 6/2021: \par 5 years ago had a colonoscopy - no polyps were found - does not recall when she was told to have a repeat exam - her doctor was Dr. Israel. \par \par No constipation. \par If over eating she says she will have diarrhea since many years - since her gallbladder surgery 34 years ago. Since 10 years she has been strictly vegetarian. \par \par No melena and no hematochezia. \par \par No abdominal pain. \par \par No loss of appetite, no abnormal weight loss. \par \par No heartburn, no difficulty swallowing, no painful swallowing. \par No nausea, no vomiting. \par \par Patient denies family history of GI cancers.\par \par Two of her children are doctors, and one is . \par \par \par \par Discussion/Summary 9/2021: \par Called patient to review prior colonoscopy in 2014 - results of colonoscopy from 2014 reviewed. No recall colonoscopy indicated by Dr. Jones - patient reports having occasional diarrhea - discussed a colonoscopy again as detailed on initial visit to r/o colon polyps, colitis etc - risks again reviewed - needing cardiology clearance discussed - she would like to proceed - she will see cardiologist and arrange for colonoscopy with our staff. \par \par Colonoscopy 5/2014 by Dr. Jones - mild sigmoid diverticulosis, 5 mm polyp in the sigmoid colon removed (normal colon mucosa), Moderate ascending diverticulosis. Mild cecal diverticulosis. Prep good - mild tortuous colon.

## 2021-10-08 NOTE — PHYSICAL EXAM
[General Appearance - Alert] : alert [General Appearance - In No Acute Distress] : in no acute distress [Sclera] : the sclera and conjunctiva were normal [Extraocular Movements] : extraocular movements were intact [Auscultation Breath Sounds / Voice Sounds] : lungs were clear to auscultation bilaterally [Heart Rate And Rhythm] : heart rate was normal and rhythm regular [Heart Sounds] : normal S1 and S2 [Heart Sounds Gallop] : no gallops [Murmurs] : no murmurs [Heart Sounds Pericardial Friction Rub] : no pericardial rub [Bowel Sounds] : normal bowel sounds [Abdomen Soft] : soft [Abdomen Tenderness] : non-tender [Cervical Lymph Nodes Enlarged Posterior Bilaterally] : posterior cervical [Supraclavicular Lymph Nodes Enlarged Bilaterally] : supraclavicular [Abnormal Walk] : normal gait [Nail Clubbing] : no clubbing  or cyanosis of the fingernails [Skin Color & Pigmentation] : normal skin color and pigmentation [] : no rash [Oriented To Time, Place, And Person] : oriented to person, place, and time [Affect] : the affect was normal [Impaired Insight] : insight and judgment were intact [FreeTextEntry1] : Large scar in RUQ

## 2021-10-08 NOTE — ASSESSMENT
[FreeTextEntry1] : IMPRESSION: \par #  Atypical chest pain - recent ER visit - seen by cardiology likely GERD\par -  Burning discomfort since past month - better since being on omeprazole \par -  Never had an upper endoscopy \par -  On aspirin currently \par \par #  Screening colonoscopy to r/o colon polyps, colon cancer\par -  Colonoscopy 5/2014 by Dr. Jones - mild sigmoid diverticulosis, 5 mm polyp in the sigmoid colon removed (normal colon mucosa), Moderate ascending diverticulosis. Mild cecal diverticulosis. Prep good - mild tortuous colon. No recall colonoscopy indicated by Dr. Jones - recent discussion about colonoscopy - indication and risks all reviewed - she want to arrange for a colonoscopy, and agreed to get cardiology clearance. \par - Occasional diarrhea when over eating for many years - perhaps bile induced diarrhea\par \par #  Comorbidities: HTN, hyperlipemia, CAD - moderate plaque 60-69% on CTA coronaries\par #  Surgery: Cholecystectomy\par #  No family hx of digestive cancers\par \par \par \par PLAN: \par I will plan for an upper endoscopy under monitored anesthesia care to rule out peptic ulcer disease, H.pylori gastritis etc.   Risks, benefits, and alternatives of the procedure were discussed with the patient. Patient understands and agrees to proceed with the planned procedure.\par \par I have advised the patient to arrange for a colonoscopy to rule out colon polyps, colorectal cancer etc. under monitored anesthesia care.  Risks such as perforation  (4 in 10,000) requiring surgery, bleeding (8 in 10,000), infection, diverticulitis, colitis, missed colon cancer (2% to 6%), internal organ injury, etc, risks of bowel prep including colitis, syncope, adverse reaction to medication etc. and risks of anesthesia including cardiopulmonary compromise were discussed with patient.  Patient verbalized understanding and agrees to proceed with the planned procedure.\par \par Need cardiology clearance. \par \par Advised to stop omeprazole 2 weeks prior to upper procedure.\par \par Follow GERD diet. \par

## 2021-10-12 ENCOUNTER — NON-APPOINTMENT (OUTPATIENT)
Age: 61
End: 2021-10-12

## 2021-10-12 ENCOUNTER — APPOINTMENT (OUTPATIENT)
Dept: CARDIOLOGY | Facility: CLINIC | Age: 61
End: 2021-10-12
Payer: MEDICAID

## 2021-10-12 VITALS
SYSTOLIC BLOOD PRESSURE: 161 MMHG | BODY MASS INDEX: 22.5 KG/M2 | OXYGEN SATURATION: 100 % | HEIGHT: 63 IN | DIASTOLIC BLOOD PRESSURE: 90 MMHG | HEART RATE: 53 BPM

## 2021-10-12 VITALS — BODY MASS INDEX: 22.5 KG/M2 | WEIGHT: 127 LBS

## 2021-10-12 DIAGNOSIS — R07.9 CHEST PAIN, UNSPECIFIED: ICD-10-CM

## 2021-10-12 PROCEDURE — 99215 OFFICE O/P EST HI 40 MIN: CPT

## 2021-10-12 PROCEDURE — 93000 ELECTROCARDIOGRAM COMPLETE: CPT

## 2021-10-12 NOTE — DISCUSSION/SUMMARY
[FreeTextEntry1] : 59 year old woman with history of HTN and HLD with mild to moderate plaque on CTA Cor here for followup after episode of chest pain\par #Chest pain- Given known CAD on CT in 2019 and episode of Chest Pain, will schedule for Nuclear Stress test to assess for ischemia\par Continue ASA\par Continue Atenolol\par #HTN- Continue Atenolol\par #HLD- Seen by Dr. Almonte, continue Praulent\par \par

## 2021-10-12 NOTE — HISTORY OF PRESENT ILLNESS
[FreeTextEntry1] : Here for followup. Had CT cor on 10/11/19 and results below.  On ASA therapy and Atenolol daily. EKG reviewed and normal. Was also seen by our Lipid Specialist, Dr. Almonte and started on Praulent over the summer. Last week had an episode of chest pain. Went to [facility] ED where her EKG and Tn were normal. Discharged home for followup. She is taking Omperazole for her GERD and has endoscopy scheduled for January.\par She is back on ASA 81 mg daily\par EKG reveiwed today and emmett but without ischemic changes\par \par \par Lipids checked September 9, 2021:\par  /HDL 47/LDL 44\par \par Cardiac CT: 2019\par IMPRESSION:  \par 1. Coronary artery calcium score =  1 Agatston Units (between the 50th \par and 75th percentile for subjects of the same age and gender).\par 2. Coronaries: \par --LM: contains luminal irregularities but is patent. \par --LAD: contains mixed plaque in the proximal segment with mild \par (approximately 30%) luminal stenosis.  There is non-calcified plaque in \par the mid LAD with likely moderate (approximately 60-69%) luminal stenosis. \par --LCX: contains luminal irregularities but appears patent.  \par --RCA: contains non-calcified plaque in the proximal segment with mild \par (approximately 30%) luminal stenosis. \par \par #Chest pain- Given known CAD on CT in 2019 and episode of Chest Pain, will schedule for Nuclear Stress test to assess for ischemia\par Continue ASA\par Continue Atenolol\par #HTN- Continue Atenolol\par #HLD- Seen by Dr. Almonte, continue Praulent\par \par \par

## 2021-10-14 ENCOUNTER — NON-APPOINTMENT (OUTPATIENT)
Age: 61
End: 2021-10-14

## 2021-10-15 ENCOUNTER — NON-APPOINTMENT (OUTPATIENT)
Age: 61
End: 2021-10-15

## 2021-10-20 DIAGNOSIS — Z12.11 ENCOUNTER FOR SCREENING FOR MALIGNANT NEOPLASM OF COLON: ICD-10-CM

## 2021-10-20 RX ORDER — POLYETHYLENE GLYCOL 3350 AND ELECTROLYTES WITH LEMON FLAVOR 236; 22.74; 6.74; 5.86; 2.97 G/4L; G/4L; G/4L; G/4L; G/4L
236 POWDER, FOR SOLUTION ORAL
Qty: 1 | Refills: 0 | Status: ACTIVE | COMMUNITY
Start: 2021-10-20 | End: 1900-01-01

## 2021-10-26 ENCOUNTER — OUTPATIENT (OUTPATIENT)
Dept: OUTPATIENT SERVICES | Facility: HOSPITAL | Age: 61
LOS: 1 days | End: 2021-10-26

## 2021-10-26 ENCOUNTER — APPOINTMENT (OUTPATIENT)
Dept: CV DIAGNOSITCS | Facility: HOSPITAL | Age: 61
End: 2021-10-26
Payer: MEDICAID

## 2021-10-26 DIAGNOSIS — I25.10 ATHEROSCLEROTIC HEART DISEASE OF NATIVE CORONARY ARTERY WITHOUT ANGINA PECTORIS: ICD-10-CM

## 2021-10-26 PROCEDURE — 93325 DOPPLER ECHO COLOR FLOW MAPG: CPT | Mod: 26,GC

## 2021-10-26 PROCEDURE — 93320 DOPPLER ECHO COMPLETE: CPT | Mod: 26,GC

## 2021-10-26 PROCEDURE — 93351 STRESS TTE COMPLETE: CPT | Mod: 26

## 2021-11-19 DIAGNOSIS — K21.9 GASTRO-ESOPHAGEAL REFLUX DISEASE W/OUT ESOPHAGITIS: ICD-10-CM

## 2021-12-09 ENCOUNTER — APPOINTMENT (OUTPATIENT)
Dept: CARDIOLOGY | Facility: CLINIC | Age: 61
End: 2021-12-09
Payer: MEDICAID

## 2021-12-09 VITALS
OXYGEN SATURATION: 99 % | WEIGHT: 130 LBS | HEART RATE: 62 BPM | HEIGHT: 63 IN | RESPIRATION RATE: 16 BRPM | BODY MASS INDEX: 23.04 KG/M2 | SYSTOLIC BLOOD PRESSURE: 114 MMHG | DIASTOLIC BLOOD PRESSURE: 70 MMHG

## 2021-12-09 PROCEDURE — 99214 OFFICE O/P EST MOD 30 MIN: CPT

## 2021-12-14 ENCOUNTER — APPOINTMENT (OUTPATIENT)
Dept: CARDIOLOGY | Facility: CLINIC | Age: 61
End: 2021-12-14

## 2022-02-28 ENCOUNTER — RX RENEWAL (OUTPATIENT)
Age: 62
End: 2022-02-28

## 2022-02-28 RX ORDER — FAMOTIDINE 40 MG/1
40 TABLET, FILM COATED ORAL
Qty: 30 | Refills: 0 | Status: ACTIVE | COMMUNITY
Start: 2021-11-19 | End: 1900-01-01

## 2022-04-07 ENCOUNTER — APPOINTMENT (OUTPATIENT)
Dept: GASTROENTEROLOGY | Facility: HOSPITAL | Age: 62
End: 2022-04-07

## 2022-04-18 RX ORDER — EVOLOCUMAB 140 MG/ML
140 INJECTION, SOLUTION SUBCUTANEOUS
Qty: 6 | Refills: 3 | Status: DISCONTINUED | COMMUNITY
Start: 2022-04-14 | End: 2022-04-18

## 2022-06-13 ENCOUNTER — APPOINTMENT (OUTPATIENT)
Dept: CARDIOLOGY | Facility: CLINIC | Age: 62
End: 2022-06-13
Payer: MEDICAID

## 2022-06-13 VITALS
WEIGHT: 127 LBS | SYSTOLIC BLOOD PRESSURE: 132 MMHG | DIASTOLIC BLOOD PRESSURE: 80 MMHG | HEIGHT: 63 IN | RESPIRATION RATE: 16 BRPM | OXYGEN SATURATION: 100 % | HEART RATE: 64 BPM | BODY MASS INDEX: 22.5 KG/M2

## 2022-06-13 PROCEDURE — 99214 OFFICE O/P EST MOD 30 MIN: CPT

## 2022-06-15 ENCOUNTER — APPOINTMENT (OUTPATIENT)
Dept: CARDIOLOGY | Facility: CLINIC | Age: 62
End: 2022-06-15

## 2022-07-25 ENCOUNTER — APPOINTMENT (OUTPATIENT)
Dept: CARDIOLOGY | Facility: CLINIC | Age: 62
End: 2022-07-25

## 2022-08-01 ENCOUNTER — APPOINTMENT (OUTPATIENT)
Dept: CARDIOLOGY | Facility: CLINIC | Age: 62
End: 2022-08-01

## 2022-08-01 PROCEDURE — 97802 MEDICAL NUTRITION INDIV IN: CPT

## 2022-08-31 PROBLEM — R73.03 PRE-DIABETES: Status: ACTIVE | Noted: 2022-08-01

## 2022-09-06 ENCOUNTER — APPOINTMENT (OUTPATIENT)
Dept: CARDIOLOGY | Facility: CLINIC | Age: 62
End: 2022-09-06

## 2022-09-06 VITALS
BODY MASS INDEX: 22.68 KG/M2 | SYSTOLIC BLOOD PRESSURE: 136 MMHG | DIASTOLIC BLOOD PRESSURE: 80 MMHG | OXYGEN SATURATION: 99 % | HEART RATE: 77 BPM | HEIGHT: 63 IN | WEIGHT: 128 LBS

## 2022-09-06 DIAGNOSIS — R73.03 PREDIABETES.: ICD-10-CM

## 2022-09-06 PROCEDURE — 99214 OFFICE O/P EST MOD 30 MIN: CPT

## 2022-09-22 ENCOUNTER — APPOINTMENT (OUTPATIENT)
Dept: CARDIOLOGY | Facility: CLINIC | Age: 62
End: 2022-09-22

## 2022-09-22 VITALS
BODY MASS INDEX: 23.21 KG/M2 | OXYGEN SATURATION: 99 % | DIASTOLIC BLOOD PRESSURE: 80 MMHG | HEIGHT: 63 IN | HEART RATE: 59 BPM | WEIGHT: 131 LBS | SYSTOLIC BLOOD PRESSURE: 120 MMHG

## 2022-09-22 DIAGNOSIS — E11.9 TYPE 2 DIABETES MELLITUS W/OUT COMPLICATIONS: ICD-10-CM

## 2022-09-22 DIAGNOSIS — E78.1 PURE HYPERGLYCERIDEMIA: ICD-10-CM

## 2022-09-22 DIAGNOSIS — R09.89 OTHER SPECIFIED SYMPTOMS AND SIGNS INVOLVING THE CIRCULATORY AND RESPIRATORY SYSTEMS: ICD-10-CM

## 2022-09-22 PROCEDURE — 99215 OFFICE O/P EST HI 40 MIN: CPT

## 2022-10-04 DIAGNOSIS — E74.39 OTHER DISORDERS OF INTESTINAL CARBOHYDRATE ABSORPTION: ICD-10-CM

## 2022-10-04 RX ORDER — EMPAGLIFLOZIN 10 MG/1
10 TABLET, FILM COATED ORAL DAILY
Qty: 90 | Refills: 3 | Status: DISCONTINUED | COMMUNITY
Start: 2022-09-22 | End: 2022-10-04

## 2022-10-31 ENCOUNTER — APPOINTMENT (OUTPATIENT)
Dept: CARDIOLOGY | Facility: CLINIC | Age: 62
End: 2022-10-31

## 2022-10-31 VITALS
WEIGHT: 131 LBS | SYSTOLIC BLOOD PRESSURE: 130 MMHG | HEART RATE: 59 BPM | OXYGEN SATURATION: 99 % | HEIGHT: 63 IN | DIASTOLIC BLOOD PRESSURE: 80 MMHG | BODY MASS INDEX: 23.21 KG/M2

## 2022-10-31 PROCEDURE — 93880 EXTRACRANIAL BILAT STUDY: CPT

## 2022-10-31 PROCEDURE — 99215 OFFICE O/P EST HI 40 MIN: CPT

## 2022-11-01 RX ORDER — ERTUGLIFLOZIN 5 MG/1
5 TABLET, FILM COATED ORAL DAILY
Qty: 30 | Refills: 3 | Status: ACTIVE | COMMUNITY
Start: 2022-11-01 | End: 1900-01-01

## 2022-11-01 RX ORDER — DAPAGLIFLOZIN 5 MG/1
5 TABLET, FILM COATED ORAL DAILY
Qty: 30 | Refills: 3 | Status: COMPLETED | COMMUNITY
Start: 2022-10-04 | End: 2022-11-01

## 2022-11-28 ENCOUNTER — RX RENEWAL (OUTPATIENT)
Age: 62
End: 2022-11-28

## 2023-01-10 ENCOUNTER — APPOINTMENT (OUTPATIENT)
Dept: CARDIOLOGY | Facility: CLINIC | Age: 63
End: 2023-01-10
Payer: MEDICAID

## 2023-01-10 ENCOUNTER — APPOINTMENT (OUTPATIENT)
Dept: CARDIOLOGY | Facility: CLINIC | Age: 63
End: 2023-01-10

## 2023-01-10 ENCOUNTER — NON-APPOINTMENT (OUTPATIENT)
Age: 63
End: 2023-01-10

## 2023-01-10 VITALS
BODY MASS INDEX: 23.03 KG/M2 | WEIGHT: 130 LBS | OXYGEN SATURATION: 99 % | DIASTOLIC BLOOD PRESSURE: 85 MMHG | SYSTOLIC BLOOD PRESSURE: 134 MMHG | HEART RATE: 61 BPM

## 2023-01-10 PROCEDURE — 99215 OFFICE O/P EST HI 40 MIN: CPT | Mod: 25

## 2023-01-10 PROCEDURE — 93000 ELECTROCARDIOGRAM COMPLETE: CPT

## 2023-01-18 ENCOUNTER — APPOINTMENT (OUTPATIENT)
Dept: CARDIOLOGY | Facility: CLINIC | Age: 63
End: 2023-01-18

## 2023-03-16 ENCOUNTER — APPOINTMENT (OUTPATIENT)
Dept: CARDIOLOGY | Facility: CLINIC | Age: 63
End: 2023-03-16
Payer: MEDICAID

## 2023-03-16 ENCOUNTER — NON-APPOINTMENT (OUTPATIENT)
Age: 63
End: 2023-03-16

## 2023-03-16 VITALS
HEIGHT: 63 IN | DIASTOLIC BLOOD PRESSURE: 94 MMHG | WEIGHT: 127 LBS | HEART RATE: 59 BPM | BODY MASS INDEX: 22.5 KG/M2 | SYSTOLIC BLOOD PRESSURE: 166 MMHG | OXYGEN SATURATION: 100 %

## 2023-03-16 DIAGNOSIS — Z78.9 OTHER SPECIFIED HEALTH STATUS: ICD-10-CM

## 2023-03-16 PROCEDURE — 99215 OFFICE O/P EST HI 40 MIN: CPT | Mod: 25

## 2023-03-16 PROCEDURE — 93000 ELECTROCARDIOGRAM COMPLETE: CPT

## 2023-03-17 NOTE — ED ADULT NURSE NOTE - NS_NURSE_DISC_TEACHING_YN_ED_ALL_ED
Patient is in the lateral left side position.   The body was positioned using the following devices: wedge.  Patient positioned self Yes

## 2023-03-30 ENCOUNTER — TRANSCRIPTION ENCOUNTER (OUTPATIENT)
Age: 63
End: 2023-03-30

## 2023-05-02 ENCOUNTER — APPOINTMENT (OUTPATIENT)
Dept: CARDIOLOGY | Facility: CLINIC | Age: 63
End: 2023-05-02

## 2023-05-09 RX ORDER — EMPAGLIFLOZIN 10 MG/1
10 TABLET, FILM COATED ORAL
Qty: 90 | Refills: 3 | Status: ACTIVE | COMMUNITY
Start: 2023-01-10 | End: 1900-01-01

## 2023-05-11 ENCOUNTER — RX RENEWAL (OUTPATIENT)
Age: 63
End: 2023-05-11

## 2023-05-11 RX ORDER — EZETIMIBE 10 MG/1
10 TABLET ORAL
Qty: 90 | Refills: 1 | Status: ACTIVE | COMMUNITY
Start: 2022-06-13 | End: 1900-01-01

## 2023-08-08 ENCOUNTER — APPOINTMENT (OUTPATIENT)
Dept: CARDIOLOGY | Facility: CLINIC | Age: 63
End: 2023-08-08
Payer: MEDICAID

## 2023-08-08 VITALS
WEIGHT: 130 LBS | DIASTOLIC BLOOD PRESSURE: 76 MMHG | HEART RATE: 63 BPM | BODY MASS INDEX: 23.04 KG/M2 | OXYGEN SATURATION: 97 % | HEIGHT: 63 IN | SYSTOLIC BLOOD PRESSURE: 124 MMHG

## 2023-08-08 PROCEDURE — 99214 OFFICE O/P EST MOD 30 MIN: CPT

## 2023-11-17 PROBLEM — R94.39 ABNORMAL STRESS TEST: Status: ACTIVE | Noted: 2019-09-17

## 2023-11-20 ENCOUNTER — APPOINTMENT (OUTPATIENT)
Dept: CARDIOLOGY | Facility: CLINIC | Age: 63
End: 2023-11-20
Payer: MEDICAID

## 2023-11-20 ENCOUNTER — NON-APPOINTMENT (OUTPATIENT)
Age: 63
End: 2023-11-20

## 2023-11-20 VITALS
WEIGHT: 128 LBS | HEIGHT: 63 IN | DIASTOLIC BLOOD PRESSURE: 80 MMHG | SYSTOLIC BLOOD PRESSURE: 126 MMHG | HEART RATE: 60 BPM | BODY MASS INDEX: 22.68 KG/M2 | OXYGEN SATURATION: 99 %

## 2023-11-20 DIAGNOSIS — I10 ESSENTIAL (PRIMARY) HYPERTENSION: ICD-10-CM

## 2023-11-20 DIAGNOSIS — R94.39 ABNORMAL RESULT OF OTHER CARDIOVASCULAR FUNCTION STUDY: ICD-10-CM

## 2023-11-20 PROCEDURE — 99215 OFFICE O/P EST HI 40 MIN: CPT | Mod: 25

## 2023-11-20 PROCEDURE — 93000 ELECTROCARDIOGRAM COMPLETE: CPT

## 2023-12-18 ENCOUNTER — APPOINTMENT (OUTPATIENT)
Dept: OTOLARYNGOLOGY | Facility: CLINIC | Age: 63
End: 2023-12-18

## 2024-03-28 ENCOUNTER — RX RENEWAL (OUTPATIENT)
Age: 64
End: 2024-03-28

## 2024-03-28 RX ORDER — ALIROCUMAB 150 MG/ML
150 INJECTION, SOLUTION SUBCUTANEOUS
Qty: 2 | Refills: 5 | Status: ACTIVE | COMMUNITY
Start: 2021-08-23 | End: 1900-01-01

## 2024-05-07 ENCOUNTER — APPOINTMENT (OUTPATIENT)
Dept: CARDIOLOGY | Facility: CLINIC | Age: 64
End: 2024-05-07

## 2024-05-28 PROBLEM — I25.10 CAD IN NATIVE ARTERY: Status: ACTIVE | Noted: 2019-10-29

## 2024-05-29 ENCOUNTER — APPOINTMENT (OUTPATIENT)
Dept: CARDIOLOGY | Facility: CLINIC | Age: 64
End: 2024-05-29
Payer: MEDICAID

## 2024-05-29 ENCOUNTER — NON-APPOINTMENT (OUTPATIENT)
Age: 64
End: 2024-05-29

## 2024-05-29 VITALS
OXYGEN SATURATION: 98 % | SYSTOLIC BLOOD PRESSURE: 108 MMHG | DIASTOLIC BLOOD PRESSURE: 80 MMHG | BODY MASS INDEX: 22.67 KG/M2 | WEIGHT: 128 LBS | HEART RATE: 56 BPM

## 2024-05-29 DIAGNOSIS — I25.10 ATHEROSCLEROTIC HEART DISEASE OF NATIVE CORONARY ARTERY W/OUT ANGINA PECTORIS: ICD-10-CM

## 2024-05-29 DIAGNOSIS — E78.5 HYPERLIPIDEMIA, UNSPECIFIED: ICD-10-CM

## 2024-05-29 PROCEDURE — 93000 ELECTROCARDIOGRAM COMPLETE: CPT

## 2024-05-29 PROCEDURE — 99215 OFFICE O/P EST HI 40 MIN: CPT | Mod: 25

## 2024-05-29 PROCEDURE — G2211 COMPLEX E/M VISIT ADD ON: CPT | Mod: NC,1L

## 2024-05-29 RX ORDER — ICOSAPENT ETHYL 1 G/1
1 CAPSULE ORAL TWICE DAILY
Qty: 360 | Refills: 3 | Status: DISCONTINUED | COMMUNITY
Start: 2022-09-22 | End: 2024-05-29

## 2024-05-29 RX ORDER — ICOSAPENT ETHYL 1 G/1
1 CAPSULE ORAL
Qty: 180 | Refills: 3 | Status: ACTIVE | COMMUNITY
Start: 2024-05-29 | End: 1900-01-01

## 2024-06-10 NOTE — ED PROVIDER NOTE - NSDCPRINTRESULTS_ED_ALL_ED
Detail Level: Zone
Patient requests all Lab, Cardiology, and Radiology Results on their Discharge Instructions

## 2024-08-09 ENCOUNTER — RX RENEWAL (OUTPATIENT)
Age: 64
End: 2024-08-09

## 2024-08-27 ENCOUNTER — RX RENEWAL (OUTPATIENT)
Age: 64
End: 2024-08-27

## 2024-10-23 ENCOUNTER — APPOINTMENT (OUTPATIENT)
Dept: CARDIOLOGY | Facility: CLINIC | Age: 64
End: 2024-10-23
Payer: MEDICAID

## 2024-10-23 VITALS
WEIGHT: 130 LBS | HEIGHT: 63 IN | OXYGEN SATURATION: 98 % | BODY MASS INDEX: 23.04 KG/M2 | HEART RATE: 80 BPM | RESPIRATION RATE: 18 BRPM | DIASTOLIC BLOOD PRESSURE: 98 MMHG | SYSTOLIC BLOOD PRESSURE: 145 MMHG

## 2024-10-23 DIAGNOSIS — E78.5 HYPERLIPIDEMIA, UNSPECIFIED: ICD-10-CM

## 2024-10-23 DIAGNOSIS — I10 ESSENTIAL (PRIMARY) HYPERTENSION: ICD-10-CM

## 2024-10-23 PROCEDURE — 93306 TTE W/DOPPLER COMPLETE: CPT

## 2024-10-23 PROCEDURE — 99214 OFFICE O/P EST MOD 30 MIN: CPT | Mod: 25

## 2024-10-23 PROCEDURE — 99214 OFFICE O/P EST MOD 30 MIN: CPT

## 2024-10-23 PROCEDURE — G2211 COMPLEX E/M VISIT ADD ON: CPT | Mod: NC

## 2024-11-13 ENCOUNTER — TRANSCRIPTION ENCOUNTER (OUTPATIENT)
Age: 64
End: 2024-11-13

## 2024-12-30 ENCOUNTER — APPOINTMENT (OUTPATIENT)
Dept: CARDIOLOGY | Facility: CLINIC | Age: 64
End: 2024-12-30

## 2025-04-03 ENCOUNTER — RX RENEWAL (OUTPATIENT)
Age: 65
End: 2025-04-03

## 2025-04-17 ENCOUNTER — NON-APPOINTMENT (OUTPATIENT)
Age: 65
End: 2025-04-17

## 2025-04-17 ENCOUNTER — APPOINTMENT (OUTPATIENT)
Dept: CARDIOLOGY | Facility: CLINIC | Age: 65
End: 2025-04-17
Payer: COMMERCIAL

## 2025-04-17 VITALS
SYSTOLIC BLOOD PRESSURE: 120 MMHG | BODY MASS INDEX: 23.03 KG/M2 | OXYGEN SATURATION: 98 % | DIASTOLIC BLOOD PRESSURE: 70 MMHG | WEIGHT: 130 LBS | HEART RATE: 72 BPM

## 2025-04-17 DIAGNOSIS — I10 ESSENTIAL (PRIMARY) HYPERTENSION: ICD-10-CM

## 2025-04-17 DIAGNOSIS — R74.8 ABNORMAL LEVELS OF OTHER SERUM ENZYMES: ICD-10-CM

## 2025-04-17 DIAGNOSIS — E78.5 HYPERLIPIDEMIA, UNSPECIFIED: ICD-10-CM

## 2025-04-17 PROCEDURE — G2211 COMPLEX E/M VISIT ADD ON: CPT | Mod: NC

## 2025-04-17 PROCEDURE — 99215 OFFICE O/P EST HI 40 MIN: CPT

## 2025-04-17 PROCEDURE — 93000 ELECTROCARDIOGRAM COMPLETE: CPT

## 2025-04-17 PROCEDURE — G0537: CPT

## 2025-04-29 ENCOUNTER — APPOINTMENT (OUTPATIENT)
Dept: CT IMAGING | Facility: CLINIC | Age: 65
End: 2025-04-29

## 2025-05-16 ENCOUNTER — APPOINTMENT (OUTPATIENT)
Dept: CT IMAGING | Facility: CLINIC | Age: 65
End: 2025-05-16
Payer: COMMERCIAL

## 2025-05-16 PROCEDURE — 75574 CT ANGIO HRT W/3D IMAGE: CPT

## 2025-05-19 ENCOUNTER — RESULT REVIEW (OUTPATIENT)
Age: 65
End: 2025-05-19

## 2025-05-19 PROCEDURE — 75580 N-INVAS EST C FFR SW ALY CTA: CPT

## 2025-05-21 ENCOUNTER — APPOINTMENT (OUTPATIENT)
Dept: CARDIOLOGY | Facility: CLINIC | Age: 65
End: 2025-05-21
Payer: COMMERCIAL

## 2025-05-21 VITALS
OXYGEN SATURATION: 95 % | SYSTOLIC BLOOD PRESSURE: 135 MMHG | WEIGHT: 127 LBS | DIASTOLIC BLOOD PRESSURE: 80 MMHG | BODY MASS INDEX: 22.5 KG/M2 | HEART RATE: 72 BPM

## 2025-05-21 DIAGNOSIS — I10 ESSENTIAL (PRIMARY) HYPERTENSION: ICD-10-CM

## 2025-05-21 DIAGNOSIS — E11.9 TYPE 2 DIABETES MELLITUS W/OUT COMPLICATIONS: ICD-10-CM

## 2025-05-21 DIAGNOSIS — I25.10 ATHEROSCLEROTIC HEART DISEASE OF NATIVE CORONARY ARTERY W/OUT ANGINA PECTORIS: ICD-10-CM

## 2025-05-21 DIAGNOSIS — E78.5 HYPERLIPIDEMIA, UNSPECIFIED: ICD-10-CM

## 2025-05-21 PROCEDURE — 99214 OFFICE O/P EST MOD 30 MIN: CPT

## 2025-05-21 PROCEDURE — G2211 COMPLEX E/M VISIT ADD ON: CPT | Mod: NC

## 2025-08-13 ENCOUNTER — RX RENEWAL (OUTPATIENT)
Age: 65
End: 2025-08-13

## 2025-08-14 ENCOUNTER — APPOINTMENT (OUTPATIENT)
Dept: ORTHOPEDIC SURGERY | Facility: CLINIC | Age: 65
End: 2025-08-14
Payer: COMMERCIAL

## 2025-08-14 DIAGNOSIS — M17.11 UNILATERAL PRIMARY OSTEOARTHRITIS, RIGHT KNEE: ICD-10-CM

## 2025-08-14 DIAGNOSIS — M25.561 PAIN IN RIGHT KNEE: ICD-10-CM

## 2025-08-14 PROCEDURE — 72170 X-RAY EXAM OF PELVIS: CPT

## 2025-08-14 PROCEDURE — 99204 OFFICE O/P NEW MOD 45 MIN: CPT

## 2025-08-14 PROCEDURE — 73564 X-RAY EXAM KNEE 4 OR MORE: CPT | Mod: RT

## 2025-08-14 RX ORDER — METHYLPREDNISOLONE 4 MG/1
4 TABLET ORAL
Qty: 1 | Refills: 0 | Status: ACTIVE | COMMUNITY
Start: 2025-08-14 | End: 1900-01-01